# Patient Record
(demographics unavailable — no encounter records)

---

## 2024-11-14 NOTE — REVIEW OF SYSTEMS
CLINICAL NUTRITION SERVICES - REASSESSMENT NOTE     Nutrition Prescription    RECOMMENDATIONS FOR MDs/PROVIDERS TO ORDER:  May need closer monitoring with eating and bowel habits if concerns arise for binging/purging    Malnutrition Status:    Severe malnutrition noted previously   % Intake: </=50% for >/= 1 month (severe)  % Weight Loss: > 5% in 1 month (severe)  Subcutaneous Fat Loss:  Upper arm region severe depletion, Thoracic region severe depletion and Lumbar region severe depletion  Muscle Loss:  Clavicle bone region severe depletion, Acromion bone region severe depletion, Scapular bone region severe depletion, Anterior thigh region severe depletion and Posterior calf region severe depletion  Fluid Retention:  None noted    Recommendations already ordered by Registered Dietitian (RD):  Discontinue TF, approved by MD   Change supplement to glucerna bid   RD to hold off on DM diet teaching due to active Eating Disorder I do not feel comfortable giving her dietary parameters at this time.   Add a daily MVI     Future/Additional Recommendations:  Will continue to monitor po closely      EVALUATION OF THE PROGRESS TOWARD GOALS   Diet: Moderate Consistent Carbohydrate with Ensure daily   Oral Intake:Pt states they are consuming 100% of meals minus this morning. Pt is not being observed eating so we only have what they states to go off of.   If they consumed 100% of meals on 4/16 they consumed 1865kcal, 91g protein orally plus their TF of   Osmolite 1.5 Jaret @ goal of  50ml/hr  (1200ml/day) provides: 1800 kcals, 75 g PRO, 914 ml free H20, 244 g CHO, and 0 g fiber daily.  FWF: 150mL every 4 hrs to meet hydration needs     NEW FINDINGS   Pt is getting full with TF and oral intake   Pt feels her oral intake has remained good   Pt is ok with me switching their supplement to glucerna due to blood sugars     ANTHROPOMETRICS  04/17/23 0414 34.4 kg (75 lb 14.4 oz) Standing scale   04/16/23 0412 34 kg (74 lb 14.4 oz) Standing  scale   04/15/23 1716 34.5 kg (76 lb) --   04/14/23 0925 31.8 kg (70 lb) --   04/13/23 1437 34 kg (75 lb)        PHYSICAL FINDINGS  Per flowsheet:   GI- abdominal discomfort, last documented BM On 4/13  Skin-no skin breakdown noted     MALNUTRITION: ( I was able to get a physical assessment today)  % Intake: </=50% for >/= 1 month (severe)  % Weight Loss: > 5% in 1 month (severe)  Subcutaneous Fat Loss:  Upper arm region severe depletion, Thoracic region severe depletion and Lumbar region severe depletion  Muscle Loss:  Clavicle bone region severe depletion, Acromion bone region severe depletion, Scapular bone region severe depletion, Anterior thigh region severe depletion and Posterior calf region severe depletion  Fluid Retention:  None noted     Malnutrition Diagnosis: Severe malnutrition    LABS  Reviewed, glucose (meter)-310, 393. 368    MEDICATIONS  Reviewed, Novolog, Lantus, Reglan, Remeron, Miralax,       NUTRITION DIAGNOSIS  Malnutrition related to disordered eating patterns as evidenced by 20% weight loss in 1 month and decreased oral intake.   Evaluation: ongoing, progressing    Goals:  Electrolytes WNL -met but blood sugars up, change goal to:  Glycemic control wnl-new  Gain weight - wt stable  Meet estimated nutrition needs-met     INTERVENTIONS  Implementation  Discontinue TF, approved by MD   Change supplement to glucerna bid   RD to hold off on DM diet teaching due to active Eating Disorder I do not feel comfortable giving them dietary parameters at this time.       Monitoring/Evaluation  Progress toward goals will be monitored and evaluated per protocol.     [Negative] : Heme/Lymph

## 2024-11-15 NOTE — HISTORY OF PRESENT ILLNESS
[de-identified] : The patient is a 57 year old male following up for his left knee (and neck pain has resolved) Date of Surgery: 3/13/24 Pain:    At Rest:  0/10  With Activity:  5/10  Mechanism of injury: patient was jumping up and down while dancing and felt pain the next day This is NOT a Work Related Injury being treated under Worker's Compensation. This is NOT an athletic injury occurring associated with an interscholastic or organized sports team. Quality of symptoms: medial knee pain, limited ROM, swelling, weakness  Better with: NSAIDs Worse with: uneven surfaces  Treatment/Imaging/Studies Since Last Visit: PT 1x/week until 11/4/24 	Reports Available For Review Today: none Out of work/sport: Currently working  School/Sport/Position/Occupation: school business official, elliptical Change since last visit: Neck pain has resolved - has some knots that he gets massage on. Was in PT 1x/week for the knee until 11/4/24, doing HEP. Reports increased medial knee pain since 10/24/24 and swelling, limited ROM, weakness, especially when on uneven surfaces.  Additional Information: s/p left knee arthroscopic partial medial meniscectomy

## 2024-11-15 NOTE — PHYSICAL EXAM
[de-identified] : The patient is a well appearing 57 year old male of their stated age. Patient ambulates with a normal gait. Negative straight leg raise bilateral   Surgical site: Left knee    Incision sites: Well healed   Range of motion: 0-135 degrees    Motor Testin+ to 5-/5 quadriceps strength   Stability Testing: Stable ligamentous examination    Swelling/Effusion/Ecchymosis: None   Tenderness to palpation: Tender medial distal hamstring    Provocative testing: Negative    Right Calf: soft and nontender Left Calf: soft and nontender   Neurovascular Examination: Grossly intact, 2+ distal pulses Contralateral Extremity: Examination grossly benign  Assessment & Plan: The patient is approximately 8 months s/p left knee arthroscopic partial medial meniscectomy (DOS: 3/13/3024). The patient's post-op plan, protocol and activity modifications have been thoroughly discussed and the patient expressed understanding. He will continue physical therapy, HEP, and stretching- emphasized working on hamstring and quad strengthening. The patient will control pain as discussed & continue ice and elevation as needed. The patient otherwise may advance activity as discussed. Recommended use of Voltaren Gel. Continue previously prescribed Naproxen 500mg BID x 2 weeks, then PRN for pain management and inflammation - use as directed and take with food.  The patient will follow up on a PRN basis unless new symptoms arise.  The patient's current medication management of their orthopedic diagnosis was reviewed today:  (1) We discussed a comprehensive treatment plan that included possible pharmaceutical management involving the use of prescription strength medications including but not limited to options such as oral Naprosyn 500mg BID, once daily Meloxicam 15 mg, or 500-650 mg Tylenol versus over the counter oral medications and topical prescription NSAID Pennsaid vs over the counter Voltaren gel.  Based on our extensive discussion, the patient was prescribed Naprosyn 500mg BID for two weeks.  It will then be used PRN for pain, inflammation and discomfort.  (2) There is a moderate risk of morbidity with further treatment, especially from use of prescription strength medications and possible side effects of these medications which include upset stomach with oral medications, skin reactions to topical medications and cardiac/renal issues with long term use.  (3) I recommended that the patient follow-up with their medical physician to discuss any significant specific potential issues with long term medication use such as interactions with current medications or with exacerbation of underlying medical comorbidities.  (4) The benefits and risks associated with use of injectable, oral or topical, prescription and over the counter anti-inflammatory medications were discussed with the patient. The patient voiced understanding of the risks including but not limited to bleeding, stroke, kidney dysfunction, heart disease, and were referred to the black box warning label for further information.  Mirtha BAILON attest that this documentation has been prepared under the direction and in the presence of Provider Dr. Roberto Card.  The documentation recorded by the scribe accurately reflects the services IDr. Roberto, personally performed and the decisions made by me.

## 2024-11-15 NOTE — HISTORY OF PRESENT ILLNESS
[de-identified] : The patient is a 57 year old male following up for his left knee (and neck pain has resolved) Date of Surgery: 3/13/24 Pain:    At Rest:  0/10  With Activity:  5/10  Mechanism of injury: patient was jumping up and down while dancing and felt pain the next day This is NOT a Work Related Injury being treated under Worker's Compensation. This is NOT an athletic injury occurring associated with an interscholastic or organized sports team. Quality of symptoms: medial knee pain, limited ROM, swelling, weakness  Better with: NSAIDs Worse with: uneven surfaces  Treatment/Imaging/Studies Since Last Visit: PT 1x/week until 11/4/24 	Reports Available For Review Today: none Out of work/sport: Currently working  School/Sport/Position/Occupation: school business official, elliptical Change since last visit: Neck pain has resolved - has some knots that he gets massage on. Was in PT 1x/week for the knee until 11/4/24, doing HEP. Reports increased medial knee pain since 10/24/24 and swelling, limited ROM, weakness, especially when on uneven surfaces.  Additional Information: s/p left knee arthroscopic partial medial meniscectomy

## 2025-03-20 NOTE — IMAGING
[Left] : left knee [AP] : anteroposterior [Lateral] : lateral [[place]] : skyline [AP Standing] : anteroposterior standing [de-identified] : The patient is a well appearing 57 year  old male of their stated age. Patient ambulates with a normal gait. Negative straight leg raise bilateral.   Effected Knee: LEFT  ROM:  0-135 degrees Lachman: Negative Pivot Shift: Negative Anterior Drawer: Negative Posterior Drawer / Sag: Negative Varus Stress 0 degrees: Stable Varus Stress 30 degrees: Stable Valgus Stress 0 degrees: Stable Valgus Stress 30 degrees: Stable Medial Hema: Negative Lateral Hema: Negative Patella Glide: 2+ Patella Apprehension: Negative Patella Grind: POSITIVE  Pes [place] Valgus: Negative Pes Cavus: Negative Triple Hop: Negative  Squat Jump: Negative    Palpation: Medial Joint Line: MILDLY TENDER  Lateral Joint Line: Nontender Medial Collateral Ligament: Nontender Lateral Collateral Ligament/PLC: Nontender Distal Femur: Nontender Proximal Tibia: Nontender Tibial Tubercle: Nontender Gerdy's Tubercle: Nontender Distal Pole Patella: Nontender Quadriceps Tendon: Nontender &  Intact Patella Tendon: Nontender &  Intact Medial Femoral Condyle: Nontender Medial Distal Hamstring/PES: TENDER  Lateral Distal Hamstring: Nontender & Stable Iliotibial Band: Nontender Medial Patellofemoral Ligament: Nontender Adductor: Nontender Proximal GSC-Plantaris: Nontender Calf: Supple & Nontender   Inspection: Deformity: No Erythema: No Ecchymosis: No Abrasions: No Effusion: MILD  Prepatellar Bursitis: No Neurologic Exam: Sensation L4-S1: Grossly Intact Motor Exam: Quadriceps: 5 out of 5 Hamstrings: 5 out of 5 EHL: 5 out of 5 FHL: 5 out of 5 TA: 5 out of 5 GS: 5 out of 5 Circulatory/Pulses: Dorsalis Pedis: 2+ Posterior Tibialis: 2+ Additional Pertinent Findings: None   Contralateral Knee:                          ROM: 0-145 degrees Other Pertinent Findings: None   Assessment: The patient is a 57 year old male with left knee pain and radiographic and physical exam findings consistent with medial hamstring tendonitis, medial & patellofemoral OA. The patient's condition is acute. Documents/Results Reviewed Today: X-Ray left knee  Tests/Studies Independently Interpreted Today: X-Ray left knee reveals evidence of previous medial patellofemoral ligament injury with medial joint line narrowing, early patellofemoral arthritis.  Pertinent findings include: 0-135 degrees, mild effusion, tender medial distal hamstring, mild MJLT, +patellofemoral grind.  Confounding medical conditions/concerns: s/p left knee arthroscopic partial medial meniscectomy (DOS: 3/13/3024)   Plan: The patient presents with increasing left knee discomfort, tightness and swelling since increasing his activity being 12 months s/o medial meniscectomy. Overall, his joint space upon review of radiographs is intact and his clinical examination correlated more-so with hamstring tendonitis and an acute aggravation of early arthritis. Patient will get back into physical therapy, HEP, and stretching. Advised patient to obtain Reparel knee sleeve. Prescribed patient Naproxen 500mg BID x 2 weeks, then PRN for pain management and inflammation - use as directed and take with food. Discussed treatment options in the form of injections to aid in pain, inflammation, and discomfort. Patient elected to receive left knee 9/1 CSI and aspiration. Advised patient to rest and ice the area as tolerated. Modify activity as discussed.  Tests Ordered: None  Prescription Medications Ordered: Naproxen 500mg  Braces/DME Ordered: Reparel Activity/Work/Sports Status: As tolerated  Additional Instructions: None Follow-Up: 4 weeks    Procedure Note: Musculoskeletal Injection Diagnosis: Left knee medial and patellofemoral arthritis, effusion  Procedure: Left knee, superolateral, 9/1 CSI and aspiration    Indication: The patient has had persistent pain despite conservative treatment.  Risks, benefits and alternatives to procedure were discussed; all questions were answered to the patient's apparent satisfaction and informed consent obtained.  The patient denied prior problems with local anesthetics, injectable cortisones, chicken allergy, coagulopathy and no relevant drug or preservative allergies or sensitivities.   The area of injection was prepared in a sterile fashion. Prior to injection a 'Time Out' was conducted in accordance with [facility] policy and the site and nature of procedure verified with the patient.   Procedure: The procedure was carried out utilizing sterile technique from a superolateral arthroscopic portal position using a sterile 21-guage needle. Alcohol & betadine utilized for skin preparation.   40cc of clear synovial fluid was aspirated using a sterile 18-guage needle. The specimen: (X) appeared benign and was discarded ( ) was sent for Culture / Cell Count / Crystal analysis / [_].]   Injection into the target area with care taken to aspirate frequently to minimize the risk of intravascular injection was performed with: ( ) 1cc of Depomedrol (80mg/ml) (X) 1cc of Dexamethasone (10mg/ml) ( ) 1cc of Toradol (30mg/ml) (X) 9cc of 0.5% Bupivacaine ( ) 1cc of 1% Lidocaine ( ) 5cc of 32mg Zilretta, prepared and diluted per  instructions ( ) 2 cc of Hylan G-F 20 (Synvisc) 16mg/2ml ( ) 6 cc of Hylan G-F 20 (SynvisoOne) 16mg/2ml ( ) 2cc of Euflexxa ( ) 2cc of Orthovisc ( ) 2cc of GelOne ( ) 3cc of Durolane (20mg/ml)   Patient tolerated the procedure well and direct pressure was applied for hemostasis. The patient was reminded of potential post-injection risks including, but not limited to, delayed hypersensitivity reactions and/or infection.  The patient verified that they had the office and the Emergency Room's contact information if any problems should arise.  After several minutes, the patient informed me that they felt fine and was released from the office.   The patient's current medication management of their orthopedic diagnosis was reviewed today: The patient was prescribed Naprosyn 500mg BID for two weeks and then as needed.  (1) We discussed a comprehensive treatment plan that included possible pharmaceutical management involving the use of prescription strength medications including but not limited to options such as oral Naprosyn 500mg BID, once daily Meloxicam 15 mg, or 500-650 mg Tylenol versus over the counter oral medications and topical prescription NSAID Pennsaid vs over the counter Voltaren gel.  Based on our extensive discussion, the patient was prescribed Naprosyn 500mg BID for two weeks.  It will then be used PRN for pain, inflammation and discomfort. (2) There is a moderate risk of morbidity with further treatment, especially from use of prescription strength medications and possible side effects of these medications which include upset stomach with oral medications, skin reactions to topical medications and cardiac/renal issues with long term use. (3) I recommended that the patient follow-up with their medical physician to discuss any significant specific potential issues with long term medication use such as interactions with current medications or with exacerbation of underlying medical comorbidities. (4) The benefits and risks associated with use of injectable, oral or topical, prescription and over the counter anti-inflammatory medications were discussed with the patient. The patient voiced understanding of the risks including but not limited to bleeding, stroke, kidney dysfunction, heart disease, and were referred to the black box warning label for further information.   IShalini attest that this documentation has been prepared under the direction and in the presence of Provider Dr. Roberto Card.   The documentation recorded by the scribe accurately reflects the services IDr. Roberto, personally performed and the decisions made by me. [[email or web address]] : X-Ray left knee reveals evidence of previous medial patellofemoral ligament injury with medial joint line narrowing, early patellofemoral arthritis.

## 2025-03-20 NOTE — HISTORY OF PRESENT ILLNESS
[de-identified] : The patient is a 57 year old male following up for his left knee (and neck pain has resolved) Date of Surgery: 3/13/24 Pain:    At Rest:  0/10  With Activity:  6/10  Mechanism of injury: patient was jumping up and down while dancing and felt pain the next day This is NOT a Work Related Injury being treated under Worker's Compensation. This is NOT an athletic injury occurring associated with an interscholastic or organized sports team. 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 Last seen 11/14/24. C/o knee stiffness and swelling that began around 12/2024 with no ANGEL. Has not been to PT. Pain is more lateral and worse with kneeling, prolonged activity.  Additional Information: s/p left knee arthroscopic partial medial meniscectomy

## 2025-03-20 NOTE — IMAGING
[Left] : left knee [AP] : anteroposterior [Lateral] : lateral [[place]] : skyline [AP Standing] : anteroposterior standing [de-identified] : The patient is a well appearing 57 year  old male of their stated age. Patient ambulates with a normal gait. Negative straight leg raise bilateral.   Effected Knee: LEFT  ROM:  0-135 degrees Lachman: Negative Pivot Shift: Negative Anterior Drawer: Negative Posterior Drawer / Sag: Negative Varus Stress 0 degrees: Stable Varus Stress 30 degrees: Stable Valgus Stress 0 degrees: Stable Valgus Stress 30 degrees: Stable Medial Hema: Negative Lateral Hema: Negative Patella Glide: 2+ Patella Apprehension: Negative Patella Grind: POSITIVE  Pes [place] Valgus: Negative Pes Cavus: Negative Triple Hop: Negative  Squat Jump: Negative    Palpation: Medial Joint Line: MILDLY TENDER  Lateral Joint Line: Nontender Medial Collateral Ligament: Nontender Lateral Collateral Ligament/PLC: Nontender Distal Femur: Nontender Proximal Tibia: Nontender Tibial Tubercle: Nontender Gerdy's Tubercle: Nontender Distal Pole Patella: Nontender Quadriceps Tendon: Nontender &  Intact Patella Tendon: Nontender &  Intact Medial Femoral Condyle: Nontender Medial Distal Hamstring/PES: TENDER  Lateral Distal Hamstring: Nontender & Stable Iliotibial Band: Nontender Medial Patellofemoral Ligament: Nontender Adductor: Nontender Proximal GSC-Plantaris: Nontender Calf: Supple & Nontender   Inspection: Deformity: No Erythema: No Ecchymosis: No Abrasions: No Effusion: MILD  Prepatellar Bursitis: No Neurologic Exam: Sensation L4-S1: Grossly Intact Motor Exam: Quadriceps: 5 out of 5 Hamstrings: 5 out of 5 EHL: 5 out of 5 FHL: 5 out of 5 TA: 5 out of 5 GS: 5 out of 5 Circulatory/Pulses: Dorsalis Pedis: 2+ Posterior Tibialis: 2+ Additional Pertinent Findings: None   Contralateral Knee:                          ROM: 0-145 degrees Other Pertinent Findings: None   Assessment: The patient is a 57 year old male with left knee pain and radiographic and physical exam findings consistent with medial hamstring tendonitis, medial & patellofemoral OA. The patient's condition is acute. Documents/Results Reviewed Today: X-Ray left knee  Tests/Studies Independently Interpreted Today: X-Ray left knee reveals evidence of previous medial patellofemoral ligament injury with medial joint line narrowing, early patellofemoral arthritis.  Pertinent findings include: 0-135 degrees, mild effusion, tender medial distal hamstring, mild MJLT, +patellofemoral grind.  Confounding medical conditions/concerns: s/p left knee arthroscopic partial medial meniscectomy (DOS: 3/13/3024)   Plan: The patient presents with increasing left knee discomfort, tightness and swelling since increasing his activity being 12 months s/o medial meniscectomy. Overall, his joint space upon review of radiographs is intact and his clinical examination correlated more-so with hamstring tendonitis and an acute aggravation of early arthritis. Patient will get back into physical therapy, HEP, and stretching. Advised patient to obtain Reparel knee sleeve. Prescribed patient Naproxen 500mg BID x 2 weeks, then PRN for pain management and inflammation - use as directed and take with food. Discussed treatment options in the form of injections to aid in pain, inflammation, and discomfort. Patient elected to receive left knee 9/1 CSI and aspiration. Advised patient to rest and ice the area as tolerated. Modify activity as discussed.  Tests Ordered: None  Prescription Medications Ordered: Naproxen 500mg  Braces/DME Ordered: Reparel Activity/Work/Sports Status: As tolerated  Additional Instructions: None Follow-Up: 4 weeks    Procedure Note: Musculoskeletal Injection Diagnosis: Left knee medial and patellofemoral arthritis, effusion  Procedure: Left knee, superolateral, 9/1 CSI and aspiration    Indication: The patient has had persistent pain despite conservative treatment.  Risks, benefits and alternatives to procedure were discussed; all questions were answered to the patient's apparent satisfaction and informed consent obtained.  The patient denied prior problems with local anesthetics, injectable cortisones, chicken allergy, coagulopathy and no relevant drug or preservative allergies or sensitivities.   The area of injection was prepared in a sterile fashion. Prior to injection a 'Time Out' was conducted in accordance with [facility] policy and the site and nature of procedure verified with the patient.   Procedure: The procedure was carried out utilizing sterile technique from a superolateral arthroscopic portal position using a sterile 21-guage needle. Alcohol & betadine utilized for skin preparation.   40cc of clear synovial fluid was aspirated using a sterile 18-guage needle. The specimen: (X) appeared benign and was discarded ( ) was sent for Culture / Cell Count / Crystal analysis / [_].]   Injection into the target area with care taken to aspirate frequently to minimize the risk of intravascular injection was performed with: ( ) 1cc of Depomedrol (80mg/ml) (X) 1cc of Dexamethasone (10mg/ml) ( ) 1cc of Toradol (30mg/ml) (X) 9cc of 0.5% Bupivacaine ( ) 1cc of 1% Lidocaine ( ) 5cc of 32mg Zilretta, prepared and diluted per  instructions ( ) 2 cc of Hylan G-F 20 (Synvisc) 16mg/2ml ( ) 6 cc of Hylan G-F 20 (SynvisoOne) 16mg/2ml ( ) 2cc of Euflexxa ( ) 2cc of Orthovisc ( ) 2cc of GelOne ( ) 3cc of Durolane (20mg/ml)   Patient tolerated the procedure well and direct pressure was applied for hemostasis. The patient was reminded of potential post-injection risks including, but not limited to, delayed hypersensitivity reactions and/or infection.  The patient verified that they had the office and the Emergency Room's contact information if any problems should arise.  After several minutes, the patient informed me that they felt fine and was released from the office.   The patient's current medication management of their orthopedic diagnosis was reviewed today: The patient was prescribed Naprosyn 500mg BID for two weeks and then as needed.  (1) We discussed a comprehensive treatment plan that included possible pharmaceutical management involving the use of prescription strength medications including but not limited to options such as oral Naprosyn 500mg BID, once daily Meloxicam 15 mg, or 500-650 mg Tylenol versus over the counter oral medications and topical prescription NSAID Pennsaid vs over the counter Voltaren gel.  Based on our extensive discussion, the patient was prescribed Naprosyn 500mg BID for two weeks.  It will then be used PRN for pain, inflammation and discomfort. (2) There is a moderate risk of morbidity with further treatment, especially from use of prescription strength medications and possible side effects of these medications which include upset stomach with oral medications, skin reactions to topical medications and cardiac/renal issues with long term use. (3) I recommended that the patient follow-up with their medical physician to discuss any significant specific potential issues with long term medication use such as interactions with current medications or with exacerbation of underlying medical comorbidities. (4) The benefits and risks associated with use of injectable, oral or topical, prescription and over the counter anti-inflammatory medications were discussed with the patient. The patient voiced understanding of the risks including but not limited to bleeding, stroke, kidney dysfunction, heart disease, and were referred to the black box warning label for further information.   IShalini attest that this documentation has been prepared under the direction and in the presence of Provider Dr. Roberto Card.   The documentation recorded by the scribe accurately reflects the services IDr. Roberto, personally performed and the decisions made by me. [FreeTextEntry9] : X-Ray left knee reveals evidence of previous medial patellofemoral ligament injury with medial joint line narrowing, early patellofemoral arthritis.

## 2025-03-20 NOTE — HISTORY OF PRESENT ILLNESS
[de-identified] : The patient is a 57 year old male following up for his left knee (and neck pain has resolved) Date of Surgery: 3/13/24 Pain:    At Rest:  0/10  With Activity:  6/10  Mechanism of injury: patient was jumping up and down while dancing and felt pain the next day This is NOT a Work Related Injury being treated under Worker's Compensation. This is NOT an athletic injury occurring associated with an interscholastic or organized sports team. 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 Last seen 11/14/24. C/o knee stiffness and swelling that began around 12/2024 with no ANGEL. Has not been to PT. Pain is more lateral and worse with kneeling, prolonged activity.  Additional Information: s/p left knee arthroscopic partial medial meniscectomy

## 2025-06-12 NOTE — HISTORY OF PRESENT ILLNESS
[de-identified] : 56yo male with PMH of bipolar disorder who was admitted for perforated appendicitis s/p laparoscopic appendectomy on 5/23, and then readmitted for intrabdominal abscess which treated with abx (no IR drainage due to no access window) and discharged on 6/4/25, presented here for first follow-up visit. Pt complaints of right lateral chest wall pain with inspiration. He has been seemed by PMD for this problem, CXR and abdominal US were obtained which showed small pleural effusion. Denied fever, chills. SBO, nausea or vomiting. Tolerates diet, no abdominal pain, having regular BM and no issue of urination. Patient finished 7 day course abx on 6/10/25.

## 2025-06-12 NOTE — PHYSICAL EXAM
[Normal Rate and Rhythm] : normal rate and rhythm [Alert] : alert [Oriented to Person] : oriented to person [Oriented to Place] : oriented to place [Oriented to Time] : oriented to time [de-identified] : NAD [de-identified] : No respiratory distress noted [de-identified] : soft, nondistended, nontender, Incisions are healing well, no sign of infection

## 2025-06-12 NOTE — ASSESSMENT
[FreeTextEntry1] : 56yo male with perforated appendicitis s/p lap appy, post-op complicated with intrabdominal abscess which treated with PO abx. Currently pt is recovering well -No heavy lifting for more than 10lb for 2 more weeks -discussed with the patient regarding the pleural effusion finding on CXR and US, since pt has no respiratory distress, no intervention needed. Recommend using incentive spirometer, pain control with lidocaine patch, tylenol and motrin PRN, continue to f/u with PMD regarding the pleural effusion  -Educated patient that if any fever >100.4, increasing abdominal pain, unable to tolerate diet, changes of bowel habit, please report to ED -Path result is discussed with the patient  -RTC PRN